# Patient Record
Sex: FEMALE | Race: WHITE
[De-identification: names, ages, dates, MRNs, and addresses within clinical notes are randomized per-mention and may not be internally consistent; named-entity substitution may affect disease eponyms.]

---

## 2018-01-25 ENCOUNTER — HOSPITAL ENCOUNTER (OUTPATIENT)
Dept: HOSPITAL 89 - OR | Age: 63
Discharge: HOME | End: 2018-01-25
Attending: SURGERY
Payer: COMMERCIAL

## 2018-01-25 VITALS — DIASTOLIC BLOOD PRESSURE: 70 MMHG | SYSTOLIC BLOOD PRESSURE: 102 MMHG

## 2018-01-25 VITALS — DIASTOLIC BLOOD PRESSURE: 39 MMHG | SYSTOLIC BLOOD PRESSURE: 117 MMHG

## 2018-01-25 VITALS — HEIGHT: 62 IN | WEIGHT: 186 LBS | BODY MASS INDEX: 34.23 KG/M2

## 2018-01-25 VITALS — SYSTOLIC BLOOD PRESSURE: 105 MMHG | DIASTOLIC BLOOD PRESSURE: 73 MMHG

## 2018-01-25 VITALS — SYSTOLIC BLOOD PRESSURE: 111 MMHG | DIASTOLIC BLOOD PRESSURE: 77 MMHG

## 2018-01-25 VITALS — SYSTOLIC BLOOD PRESSURE: 114 MMHG | DIASTOLIC BLOOD PRESSURE: 83 MMHG

## 2018-01-25 VITALS — DIASTOLIC BLOOD PRESSURE: 63 MMHG | SYSTOLIC BLOOD PRESSURE: 106 MMHG

## 2018-01-25 VITALS — SYSTOLIC BLOOD PRESSURE: 121 MMHG | DIASTOLIC BLOOD PRESSURE: 72 MMHG

## 2018-01-25 DIAGNOSIS — Z12.11: Primary | ICD-10-CM

## 2018-01-25 DIAGNOSIS — K63.5: ICD-10-CM

## 2018-01-25 DIAGNOSIS — K57.30: ICD-10-CM

## 2018-01-25 PROCEDURE — 45380 COLONOSCOPY AND BIOPSY: CPT

## 2018-01-25 PROCEDURE — 00811 ANES LWR INTST NDSC NOS: CPT

## 2018-01-25 PROCEDURE — 88305 TISSUE EXAM BY PATHOLOGIST: CPT

## 2018-01-25 NOTE — OPERATIVE REPORT 1
EVENT DATE:  January 25, 2018

SURGEON:  Ronald Francis MD

ANESTHESIOLOGIST:  Rj Garcia MD

ANESTHESIA:  Sedation.





PREOPERATIVE DIAGNOSIS  

Screening colonoscopy.



POSTOPERATIVE DIAGNOSES 

1.  Sigmoid diverticulosis.

2.  A 2 mm polyp at 10 cm.  



PROCEDURE PERFORMED 

Colonoscopy with polypectomy.



DESCRIPTION OF PROCEDURE 

The patient was placed in the left lateral decubitus position and given 
intravenous sedation.  The rectal exam was unremarkable.  A flexible 
colonoscope was inserted and advanced to the cecum.  She had an excellent bowel 
prep.  Ileocecal valve and base of the cecum were identified.  The scope was 
slowly withdrawn.  Care was taken to look behind the haustral folds.  No 
abnormalities were noted in the cecum, right colon, transverse, or descending 
colon.  She had a few scattered diverticula in the sigmoid colon.  No evidence 
of diverticulitis.  In the rectum, she had a 2 mm polyp at 10 cm.  This was 
removed with the cold cup.  The scope was retroflexed.  No abnormalities were 
noted.  The patient tolerated the procedure well with no apparent 
complications.  



The patient will require a repeat colonoscopy in five years if that polyp is 
adenomatous.  
KAYA

## 2018-01-25 NOTE — SHORT(OUTPT) DISCHARGE SUMMARY
Discharge Summary


Reason for Hosp/Final Diag:  


(1) Encounter for screening colonoscopy


Hospital Course & Plan:  2 mm polyp at 10 cm and sigmoid diverticulosis





Departure


Discharge to:  Home





Discharge Instructions


Home Meds


Reported Medications


Gluc/Cholo-Msm#1/Vit C/Kobe/Bor (GLUCOSA-CHOND-MSM COMPLEX CPLT) 1 Each Tablet, 

1 EACH PO DAILY


   1/18/18


Fish Oil/Dha/Epa (FISH OIL 1,200 MG FISH OIL) 1 Each Capsule, 1 EACH PO DAILY, 

CAPSULE


   1/18/18


Ubidecarenone (COQ-10) 100 Mg Capsule, 100 MG PO DAILY, CAPSULE


   1/18/18


Cranberry Fruit Concentrate (CRANBERRY) 450 Mg Capsule, 450 MG PO QDAY, CAPSULE


   1/18/18


Calcium Carbonate (CALCIUM) 600 Mg Tablet, 1200 MG PO DAILY


   1/18/18


Aspirin (ASPIRIN) 81 Mg Tab.chew, 81 MG PO QDAY, TAB.CHEW


   1/18/18


Zolpidem Tartrate (AMBIEN (OR EQUIV)) 5 Mg Tab, 5 MG PO


   KEEP TO A MINIMUM


   8/20/12


Zolmitriptan (Zomig) 2.5 Mg Tab, 2.5 MG PO PRN Y


   8/19/12


Triamterene/Hctz (DYAZIDE 37.5-25 MG (OR EQUIV)) 1 Ea Tab, 1 EA PO DAILY


   8/19/12


Ezetimibe/Simvastatin (Vytorin 10/10 Mg Tablet) 1 Tab Tablet, 1 TAB PO HS


   8/19/12


Cholecalciferol (Vitamin D) 2,000 Unit Capsule, 3000 UNIT PO DAILY


   8/19/12


Ibandronate Sodium (Boniva) 150 Mg Tablet, 150 MG PO MONTHLY


   8/19/12


Discontinued Reported Medications


Topiramate (TOPAMAX (OR EQUIV)) 25 Mg Tab, 25 MG PO HS


   8/19/12


Bupropion Hcl (Wellbutrin Xl) 300 Mg Tab.sr.24h, 300 MG PO QDAY


   8/19/12


Calc/D3/Mag/Zn//Kobe/Boron (CALCIUM 600 MG PLUS VIT D TAB) 1 Each Tablet, 1 

EACH PO BIDAC


   8/19/12


Oxycodone/Acetaminophen (PERCOCET 5/325 (OR EQUIV)) 1 Ea Tab, 1 EA PO Q4-6H Y, #

20


   8/19/12


Ibuprofen (MOTRIN (OR EQUIV)) 200 Mg Tab, 400 MG PO QID Y


   8/19/12


Aspirin (Aspirin) 325 Mg Tab, 325 MG PO QDAY


   11/19/07


Diet:  High Fiber


Activity:  As Tolerated











VENANCIO BARBOSA MD Jan 25, 2018 07:20

## 2018-05-10 ENCOUNTER — HOSPITAL ENCOUNTER (EMERGENCY)
Dept: HOSPITAL 89 - ER | Age: 63
Discharge: HOME | End: 2018-05-10
Payer: COMMERCIAL

## 2018-05-10 VITALS — SYSTOLIC BLOOD PRESSURE: 141 MMHG | DIASTOLIC BLOOD PRESSURE: 114 MMHG

## 2018-05-10 DIAGNOSIS — S42.291A: Primary | ICD-10-CM
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
## 2018-05-10 PROCEDURE — 73030 X-RAY EXAM OF SHOULDER: CPT

## 2018-05-10 PROCEDURE — 73080 X-RAY EXAM OF ELBOW: CPT

## 2018-05-10 PROCEDURE — 99282 EMERGENCY DEPT VISIT SF MDM: CPT

## 2018-05-10 NOTE — ER REPORT
History and Physical


Time Seen By MD:  16:54


HPI/ROS


CHIEF COMPLAINT: R shoulder pain





HISTORY OF PRESENT ILLNESS: Pt was walking her daughters dog at [facility] 

and the dog went to go see another dog and the patient fell forward.  Has pain 

in right shoulder.  Pt state that she did not directly land on her shoulder but 

fells the dog pulled that shoulder when trying to see the other dog. Pt denies 

loc. No headache. No neck pain. No numbness down the arm. pt has pain mid 

humerus when asked. Pain worse with any movement of right arm. 





REVIEW OF SYSTEMS:





Musculoskeletal: No back pain, + r shoulder pain


Neuro: no numbness,no loc


Allergies:  


Coded Allergies:  


     No Known Drug Allergies (Verified , 5/10/18)


Home Meds


Reported Medications


Alendronate Sodium (FOSAMAX) 70 Mg Tablet, 70 MG PO QWK, TAB


   5/10/18


Gluc/Cholo-Msm#1/Vit C/Kobe/Bor (GLUCOSA-CHOND-MSM COMPLEX CPLT) 1 Each Tablet, 

1 EACH PO DAILY


   1/18/18


Fish Oil/Dha/Epa (FISH OIL 1,200 MG FISH OIL) 1 Each Capsule, 1 EACH PO DAILY, 

CAPSULE


   1/18/18


Ubidecarenone (COQ-10) 100 Mg Capsule, 100 MG PO DAILY, CAPSULE


   1/18/18


Cranberry Fruit Concentrate (CRANBERRY) 450 Mg Capsule, 450 MG PO QDAY, CAPSULE


   1/18/18


Calcium Carbonate (CALCIUM) 600 Mg Tablet, 1200 MG PO DAILY


   1/18/18


Aspirin (ASPIRIN) 81 Mg Tab.chew, 81 MG PO QDAY, TAB.CHEW


   1/18/18


Zolpidem Tartrate (AMBIEN (OR EQUIV)) 5 Mg Tab, 5 MG PO


   KEEP TO A MINIMUM


   8/20/12


Zolmitriptan (Zomig) 2.5 Mg Tab, 2.5 MG PO PRN Y


   8/19/12


Triamterene/Hctz (DYAZIDE 37.5-25 MG (OR EQUIV)) 1 Ea Tab, 1 EA PO DAILY


   8/19/12


Ezetimibe/Simvastatin (Vytorin 10/10 Mg Tablet) 1 Tab Tablet, 1 TAB PO HS


   8/19/12


Cholecalciferol (Vitamin D) 2,000 Unit Capsule, 3000 UNIT PO DAILY


   8/19/12


Discontinued Reported Medications


Ibandronate Sodium (Boniva) 150 Mg Tablet, 150 MG PO MONTHLY


   8/19/12


Past Medical/Surgical History


Pmhx: htn


Pshx: appy, marisel, wrist,tubal


Reviewed Nurses Notes:  Yes


Hx Smoking:  No


Smoking Status:  Never Smoker


Hx Substance Use Disorder:  No


Hx Alcohol Use:  Yes


Constitutional





Vital Sign - Last 24 Hours








 5/10/18





 16:55


 


Temp 98.2


 


Pulse 86


 


Resp 16


 


B/P (MAP) 141/114


 


Pulse Ox 91


 


O2 Delivery Room Air





General Appearance: The patient is alert, has no immediate need for airway 

protection and no signs of toxicity.


Eyes: Pupils equal and round no pallor or injection, EOMI


ENT:  no pharyngeal erythema or exudates, Mucous membranes are moist, TM are nl 

b/l


Respiratory: There are no retractions, lungs are clear to auscultation.


Cardiovascular: Regular rate and rhythm. pulses are equal and symmetrical


Gastrointestinal:  Abdomen is soft and non tender, no masses, bowel sounds 

normal, no guarding, no rigidity or rebound


Neurological: Cranial nerves II-XII grossly intact


Musculoskeletal: Neck is supple non tender, no vertebral tenderness


lower extremities are nontender, nonswollen and have full range of motion; Left 

upper extremity is non tender, non swollen and has FROM;  + pain without 

deformity at proximal humerus with palpation. pt has pain with abduction of the 

r arm and with any rotation; Pt has pain in mid humerus with pronation and 

supination at the elbow.  no ulnar or radial tenderness.








DIFFERENTIAL DIAGNOSIS: After history and physical exam differential diagnosis 

was considered for shoulder dislocation, fx contusion, rotator cuff injury





Medical Decision Making


ED Course/Re-evaluation


ED Course


will xray


Decision to Disposition Date:  May 10, 2018


Decision to Disposition Time:  17:38





Depart


Departure


Latest Vital Signs





Vital Signs








  Date Time  Temp Pulse Resp B/P (MAP) Pulse Ox O2 Delivery O2 Flow Rate FiO2


 


5/10/18 16:55 98.2 86 16 141/114 91 Room Air  








Impression:  


 Primary Impression:  


 Humeral head fracture


Condition:  Condition Unchanged


Disposition:  HOME OR SELF-CARE


Referrals:  


LIZETH MORENO (PCP)











AKREN BECERRA MD


2 Days





[facility]


2 Days


Patient Instructions:  Proximal Humerus Fracture (ED)





Additional Instructions:  


You fractured your humerus. 


Keep your arm in the shoulder immobilizer. 


ice, motrin/tylenol





Follow up with orthopedics. Call tomorrow to make arrangements to be seen.





Problem Qualifiers








 Primary Impression:  


 Humeral head fracture


 Encounter type:  initial encounter  Fracture type:  closed  Laterality:  right

  Qualified Codes:  S42.291A - Other displaced fracture of upper end of right 

humerus, initial encounter for closed fracture








EDWAR KUMAR DO May 10, 2018 16:58

## 2018-05-10 NOTE — RADIOLOGY IMAGING REPORT
FACILITY: [facility] [place] 

 

PATIENT NAME: Arianna Silver

: 1955

MR: [id number]

V: [id number]

EXAM DATE: 

ORDERING PHYSICIAN: EDWAR KUMAR

TECHNOLOGIST: 

 

Location: [facility] [place]

Patient: Arianna Silver

: 1955

MRN: [id number]

Visit/Account:[id number]

Date of Sevice:  5/10/2018

 

ACCESSION #: [id number]

 

 

INDICATION:  fell walking the dog in the park.  Fall.

 

DATE: 5/10/2018 5:26 PM.

 

TECHNIQUE: SHOULDER MIN 2 VIEWS RIGHT, ELBOW 3 VIEWS RIGHT

 

COMPARISON: None

 

 

FINDINGS:

Right shoulder: There is a fracture of the proximal right humerus involving the greater tuberosity. N
o glenohumeral dislocation. No widening of the AC joint.

 

Right elbow: Normal alignment. No effusion. No fracture or dislocation.

 

IMPRESSION:

Fracture of the proximal humerus through the greater tuberosity. No fracture of the elbow.

 

Report Dictated By: Courtney Nance MD at 5/10/2018 5:26 PM

 

Report E-Signed By: Courtney Nance MD  at 5/10/2018 5:28 PM

 

WSN:[id number]

## 2018-05-10 NOTE — RADIOLOGY IMAGING REPORT
FACILITY: [facility] 

 

PATIENT NAME: Arianna Silver

: 1955

MR: [id number]

V: [id number]

EXAM DATE: 

ORDERING PHYSICIAN: EDWAR KUMAR

TECHNOLOGIST: 

 

Location: [facility] [place]

Patient: Arianna Silver

: 1955

MRN: [id number]

Visit/Account:[id number]

Date of Sevice:  5/10/2018

 

ACCESSION #: [id number]

 

 

INDICATION:  fell walking the dog in the park.  Fall.

 

DATE: 5/10/2018 5:26 PM.

 

TECHNIQUE: SHOULDER MIN 2 VIEWS RIGHT, ELBOW 3 VIEWS RIGHT

 

COMPARISON: None

 

 

FINDINGS:

Right shoulder: There is a fracture of the proximal right humerus involving the greater tuberosity. N
o glenohumeral dislocation. No widening of the AC joint.

 

Right elbow: Normal alignment. No effusion. No fracture or dislocation.

 

IMPRESSION:

Fracture of the proximal humerus through the greater tuberosity. No fracture of the elbow.

 

Report Dictated By: Courtney Nance MD at 5/10/2018 5:26 PM

 

Report E-Signed By: Courtney Nance MD  at 5/10/2018 5:28 PM

 

WSN:[id number]

## 2018-05-14 ENCOUNTER — HOSPITAL ENCOUNTER (OUTPATIENT)
Dept: HOSPITAL 89 - CT | Age: 63
End: 2018-05-14
Attending: ORTHOPAEDIC SURGERY
Payer: COMMERCIAL

## 2018-05-14 DIAGNOSIS — S42.251A: Primary | ICD-10-CM

## 2018-05-14 NOTE — RADIOLOGY IMAGING REPORT
FACILITY: [facility] 

 

PATIENT NAME: Arianna Silver

: 1955

MR: [id number]

V: [id number]

EXAM DATE: 

ORDERING PHYSICIAN: JERRY NDIAYE

TECHNOLOGIST: 

 

Location: [facility] [place]

Patient: Arianna Silver

: 1955

MRN: [id number]

Visit/Account:[id number]

Date of Sevice:  2018

 

ACCESSION #: [id number]

 

SHOULDER RIGHT W/O CONTRAST

 

COMPARISON:  None.

 

HISTORY:  Right humerus greater tuberosity fracture.

 

TECHNIQUE:  Noncontrast axial CT of the right shoulder with coronal and sagittal reformats.

 

One of the following dose optimization  techniques was utilized in the performance of this exam: auto
mated exposure control; adjustment of the mA and/or kV according to patient size; or use of iterative
 reconstruction technique.  Specific details can be referenced in the facility's radiology CT exam op
erational policy.

 

CONTRAST:  None.

 

FINDINGS:

BONES :  Acute mildly comminuted, predominantly oblique fracture through the base of the greater tube
rcle of the right humeral head, dominant fracture fragment displaced posteriorly by 6 mm, laterally b
y 6 mm, on no significant angulation. No other fractures. The lesser tubercle and surgical neck/proxi
mal humeral shaft are intact. The glenoid is intact and unremarkable alignment is normal. The AC join
t and marked for mild degenerative cystic change in the distal clavicle without proliferative changes
. Visualized right ribs are intact and unremarkable.

 

FLUID:  No appreciable effusion or drainable fluid collection.

 

SOFT TISSUES:  Unremarkable.  No focal muscle atrophy or appreciable muscle edema.

 

OTHER:  Negative.

 

IMPRESSION:

Mildly comminuted fracture of the greater tubercle of the right humeral head without significant disp
lacement or angulation..

 

Report Dictated By: Ignacio Louise at 2018 5:22 PM

 

Report E-Signed By: Ignacio Louise  at 2018 5:29 PM

 

WSN:[id number]

## 2018-11-30 ENCOUNTER — HOSPITAL ENCOUNTER (OUTPATIENT)
Dept: HOSPITAL 89 - MAMO | Age: 63
End: 2018-11-30
Attending: NURSE PRACTITIONER
Payer: COMMERCIAL

## 2018-11-30 DIAGNOSIS — Z80.3: ICD-10-CM

## 2018-11-30 DIAGNOSIS — Z12.31: Primary | ICD-10-CM

## 2018-11-30 PROCEDURE — 77063 BREAST TOMOSYNTHESIS BI: CPT

## 2018-11-30 PROCEDURE — 77067 SCR MAMMO BI INCL CAD: CPT

## 2018-11-30 NOTE — RADIOLOGY IMAGING REPORT
FACILITY: [facility] 

 

PATIENT NAME: DERIK TORRES

: [id number]

MR: [id number]

V: [id number]

EXAM DATE: 

ORDERING PHYSICIAN: LIZETH MORENO

TECHNOLOGIST: Lara Sam

 

PROCEDURE:BILATERAL DIGITAL SCREENING MAMMOGRAM WITH CAD ASSISTED 

INTERPRETATION & 3D TOMOSYNTHESIS 

 

COMPARISON:Prior mammograms 10/20/16, 10/15/15, 10/1/14, 13, 

12.

 

INDICATIONS:SCREENING

 

FINDINGS:  

Moderately heterogeneous fibroglandular tissue is seen throughout the 

breasts. The parenchymal pattern has remained stable allowing for 

difference in mammographic technique & patient positioning. There is 

no evidence of malignant appearing mass, malignant appearing 

calcifications or other secondary sign of malignancy in either breast.

 

DIAGNOSTIC CATEGORY 1--NEGATIVE.  

 

RECOMMENDATIONS:

ROUTINE MAMMOGRAM AND CLINICAL EVALUATION.   

 

IMPRESSION: 

BIRADS 1: Negative.

No significant abnormality is seen.

 

 

 

 

 

 

 

 

 

Dictated by:  Maryann Newman M.D. on 2018 at 8:54   

Transcribed by: FIX on 2018 at 9:50    

Approved by:  Maryann Newman M.D. on 2018 at 14:28   

[facility]

## 2022-07-20 NOTE — POST OPERATIVE PROGRESS NOTE
Post Operative Progress Note


Date:  Jan 25, 2018


Time:  08:42


Surgeon:  


heber


Anesthesia:  


dr wright


Pre-Op Diagnosis:  


screening colonoscopy


Post-Op Diagnosis:  


sigmoid diverticulosis and 2 mm polyp at 10 cm


Procedure(s):  


colonoscopy and polypectomy











VENANCIO BARBOSA MD Jan 25, 2018 07:20 Bactrim Counseling:  I discussed with the patient the risks of sulfa antibiotics including but not limited to GI upset, allergic reaction, drug rash, diarrhea, dizziness, photosensitivity, and yeast infections.  Rarely, more serious reactions can occur including but not limited to aplastic anemia, agranulocytosis, methemoglobinemia, blood dyscrasias, liver or kidney failure, lung infiltrates or desquamative/blistering drug rashes.